# Patient Record
Sex: MALE | URBAN - NONMETROPOLITAN AREA
[De-identification: names, ages, dates, MRNs, and addresses within clinical notes are randomized per-mention and may not be internally consistent; named-entity substitution may affect disease eponyms.]

---

## 2020-12-22 ENCOUNTER — APPOINTMENT (RX ONLY)
Dept: URBAN - NONMETROPOLITAN AREA CLINIC 32 | Facility: CLINIC | Age: 70
Setting detail: DERMATOLOGY
End: 2020-12-22

## 2020-12-22 DIAGNOSIS — L0391 CELLULITIS AND ABSCESS OF UNSPECIFIED SITES: ICD-10-CM

## 2020-12-22 DIAGNOSIS — L30.9 DERMATITIS, UNSPECIFIED: ICD-10-CM

## 2020-12-22 DIAGNOSIS — L0390 CELLULITIS AND ABSCESS OF UNSPECIFIED SITES: ICD-10-CM

## 2020-12-22 PROBLEM — L02.91 CUTANEOUS ABSCESS, UNSPECIFIED: Status: ACTIVE | Noted: 2020-12-22

## 2020-12-22 PROCEDURE — 99201: CPT

## 2020-12-22 PROCEDURE — ? OTHER

## 2020-12-22 PROCEDURE — ? ADDITIONAL NOTES

## 2020-12-22 NOTE — PROCEDURE: OTHER
Other (Free Text): START BACITRACIN, TEGADERM, CHANGE BANDAGES EVERY 12 HR. WITH ANY ERYTH, RTC. NO PICKING OR TRYING TO EXPELL ANY DEBRIS. PLAN TE IN FUTURE AS ABSCESS WILL MORE THAN LIKELY RECUR.
Detail Level: Zone
Note Text (......Xxx Chief Complaint.): This diagnosis correlates with the
Other (Free Text): PLAN TE IN FUTURE.

## 2021-07-06 ENCOUNTER — APPOINTMENT (RX ONLY)
Dept: URBAN - NONMETROPOLITAN AREA CLINIC 32 | Facility: CLINIC | Age: 71
Setting detail: DERMATOLOGY
End: 2021-07-06

## 2021-07-06 DIAGNOSIS — D22 MELANOCYTIC NEVI: ICD-10-CM

## 2021-07-06 DIAGNOSIS — T814XXA OTHER POSTOPERATIVE INFECTION: ICD-10-CM

## 2021-07-06 DIAGNOSIS — K6811 OTHER POSTOPERATIVE INFECTION: ICD-10-CM

## 2021-07-06 DIAGNOSIS — L82.1 OTHER SEBORRHEIC KERATOSIS: ICD-10-CM

## 2021-07-06 PROBLEM — D22.9 MELANOCYTIC NEVI, UNSPECIFIED: Status: ACTIVE | Noted: 2021-07-06

## 2021-07-06 PROBLEM — T81.41XD INFECTION FOLLOWING A PROCEDURE, SUPERFICIAL INCISIONAL SURGICAL SITE, SUBSEQUENT ENCOUNTER: Status: ACTIVE | Noted: 2021-07-06

## 2021-07-06 PROCEDURE — ? TREATMENT REGIMEN

## 2021-07-06 PROCEDURE — ? EDUCATIONAL RESOURCES PROVIDED

## 2021-07-06 PROCEDURE — ? PRESCRIPTION

## 2021-07-06 PROCEDURE — ? COUNSELING

## 2021-07-06 PROCEDURE — 99213 OFFICE O/P EST LOW 20 MIN: CPT

## 2021-07-06 RX ORDER — CEPHALEXIN 500 MG/1
TABLET ORAL QID
Qty: 40 | Refills: 1 | Status: ERX | COMMUNITY
Start: 2021-07-06

## 2021-07-06 RX ADMIN — CEPHALEXIN: 500 TABLET ORAL at 00:00

## 2021-07-06 ASSESSMENT — LOCATION DETAILED DESCRIPTION DERM: LOCATION DETAILED: RIGHT BUTTOCK

## 2021-07-06 ASSESSMENT — LOCATION SIMPLE DESCRIPTION DERM: LOCATION SIMPLE: RIGHT BUTTOCK

## 2021-07-06 ASSESSMENT — LOCATION ZONE DERM: LOCATION ZONE: TRUNK

## 2021-07-06 NOTE — PROCEDURE: TREATMENT REGIMEN
Plan: Lesion assessed in dec 2020, sx excised at [facility], now with recurrence at same site, smaller than before with purulent d/c today, tender and pt expresses it is growing and very tender at times. \\nstart keflex today, notes sent to primary provider and hopefully excised in 2-3 weeks. Plan: Lesion assessed in dec 2020, sx excised at [facility], now with recurrence at same site, smaller than before with purulent d/c today, tender and pt expresses it is growing and very tender at times. \\nstart keflex today, notes sent to primary provider and hopefully excised in 2-3 weeks.

## 2021-07-06 NOTE — PROCEDURE: REASSURANCE
Include Location In Plan?: Yes
Hide Additional Notes?: No
Additional Note: DTO'S REGARDING FURTHER CARE
Detail Level: Generalized